# Patient Record
Sex: FEMALE | URBAN - METROPOLITAN AREA
[De-identification: names, ages, dates, MRNs, and addresses within clinical notes are randomized per-mention and may not be internally consistent; named-entity substitution may affect disease eponyms.]

---

## 2020-11-06 ENCOUNTER — OFFICE VISIT (OUTPATIENT)
Dept: URBAN - METROPOLITAN AREA CLINIC 13 | Facility: CLINIC | Age: 35
End: 2020-11-06
Payer: COMMERCIAL

## 2020-11-06 PROCEDURE — 92004 COMPRE OPH EXAM NEW PT 1/>: CPT | Performed by: OPHTHALMOLOGY

## 2020-11-06 PROCEDURE — 92134 CPTRZ OPH DX IMG PST SGM RTA: CPT | Performed by: OPHTHALMOLOGY

## 2020-11-06 ASSESSMENT — INTRAOCULAR PRESSURE
OS: 16
OD: 16

## 2020-11-06 NOTE — IMPRESSION/PLAN
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 Plan: Exam/OCT show yellow subfoveal spot with corresponding RPE disruption c/w solar retinopathy OU. Discussed Dx and NHx with patient. No Tx indicated at this time, will monitor for resolution. Color photos taken for documentation. 

3 months, color/OCT OU

## 2021-02-05 ENCOUNTER — OFFICE VISIT (OUTPATIENT)
Dept: URBAN - METROPOLITAN AREA CLINIC 13 | Facility: CLINIC | Age: 36
End: 2021-02-05
Payer: COMMERCIAL

## 2021-02-05 DIAGNOSIS — H31.023 SOLAR RETINOPATHY, BILATERAL: Primary | ICD-10-CM

## 2021-02-05 PROCEDURE — 92012 INTRM OPH EXAM EST PATIENT: CPT | Performed by: OPHTHALMOLOGY

## 2021-02-05 PROCEDURE — 92134 CPTRZ OPH DX IMG PST SGM RTA: CPT | Performed by: OPHTHALMOLOGY

## 2021-02-05 ASSESSMENT — INTRAOCULAR PRESSURE
OS: 13
OD: 12

## 2021-02-05 NOTE — IMPRESSION/PLAN
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 Plan: Exam/photos/OCT show improving yellow subfoveal spot with corresponding RPE disruption OU. No Tx indicated at this time, will monitor for resolution.   

3 months, color/OCT OU